# Patient Record
Sex: FEMALE | URBAN - METROPOLITAN AREA
[De-identification: names, ages, dates, MRNs, and addresses within clinical notes are randomized per-mention and may not be internally consistent; named-entity substitution may affect disease eponyms.]

---

## 2017-11-01 ENCOUNTER — OUTPATIENT (OUTPATIENT)
Dept: OUTPATIENT SERVICES | Facility: HOSPITAL | Age: 27
LOS: 1 days | End: 2017-11-01
Payer: MEDICAID

## 2017-11-01 PROCEDURE — G9001: CPT

## 2017-11-27 ENCOUNTER — EMERGENCY (EMERGENCY)
Facility: HOSPITAL | Age: 27
LOS: 1 days | Discharge: LEFT BEFORE DISCHARGE | End: 2017-11-27
Attending: EMERGENCY MEDICINE | Admitting: EMERGENCY MEDICINE
Payer: MEDICAID

## 2017-11-27 VITALS
DIASTOLIC BLOOD PRESSURE: 74 MMHG | TEMPERATURE: 98 F | OXYGEN SATURATION: 100 % | SYSTOLIC BLOOD PRESSURE: 119 MMHG | HEART RATE: 88 BPM | RESPIRATION RATE: 16 BRPM

## 2017-11-27 VITALS
RESPIRATION RATE: 18 BRPM | HEART RATE: 98 BPM | SYSTOLIC BLOOD PRESSURE: 133 MMHG | DIASTOLIC BLOOD PRESSURE: 75 MMHG | TEMPERATURE: 98 F | OXYGEN SATURATION: 100 %

## 2017-11-27 LAB
ALBUMIN SERPL ELPH-MCNC: 4.4 G/DL — SIGNIFICANT CHANGE UP (ref 3.3–5)
ALP SERPL-CCNC: 62 U/L — SIGNIFICANT CHANGE UP (ref 40–120)
ALT FLD-CCNC: 9 U/L — SIGNIFICANT CHANGE UP (ref 4–33)
APPEARANCE UR: CLEAR — SIGNIFICANT CHANGE UP
AST SERPL-CCNC: 18 U/L — SIGNIFICANT CHANGE UP (ref 4–32)
BASOPHILS # BLD AUTO: 0.06 K/UL — SIGNIFICANT CHANGE UP (ref 0–0.2)
BASOPHILS NFR BLD AUTO: 0.9 % — SIGNIFICANT CHANGE UP (ref 0–2)
BILIRUB SERPL-MCNC: 0.4 MG/DL — SIGNIFICANT CHANGE UP (ref 0.2–1.2)
BILIRUB UR-MCNC: NEGATIVE — SIGNIFICANT CHANGE UP
BLOOD UR QL VISUAL: HIGH
BUN SERPL-MCNC: 10 MG/DL — SIGNIFICANT CHANGE UP (ref 7–23)
CALCIUM SERPL-MCNC: 8.6 MG/DL — SIGNIFICANT CHANGE UP (ref 8.4–10.5)
CHLORIDE SERPL-SCNC: 103 MMOL/L — SIGNIFICANT CHANGE UP (ref 98–107)
CK MB BLD-MCNC: 1.16 NG/ML — SIGNIFICANT CHANGE UP (ref 1–4.7)
CK MB BLD-MCNC: SIGNIFICANT CHANGE UP (ref 0–2.5)
CK SERPL-CCNC: 58 U/L — SIGNIFICANT CHANGE UP (ref 25–170)
CO2 SERPL-SCNC: 23 MMOL/L — SIGNIFICANT CHANGE UP (ref 22–31)
COLOR SPEC: SIGNIFICANT CHANGE UP
CREAT SERPL-MCNC: 0.66 MG/DL — SIGNIFICANT CHANGE UP (ref 0.5–1.3)
D DIMER BLD IA.RAPID-MCNC: 176 NG/ML — SIGNIFICANT CHANGE UP
EOSINOPHIL # BLD AUTO: 0.06 K/UL — SIGNIFICANT CHANGE UP (ref 0–0.5)
EOSINOPHIL NFR BLD AUTO: 0.9 % — SIGNIFICANT CHANGE UP (ref 0–6)
GLUCOSE SERPL-MCNC: 99 MG/DL — SIGNIFICANT CHANGE UP (ref 70–99)
GLUCOSE UR-MCNC: NEGATIVE — SIGNIFICANT CHANGE UP
HCT VFR BLD CALC: 41.2 % — SIGNIFICANT CHANGE UP (ref 34.5–45)
HGB BLD-MCNC: 12.8 G/DL — SIGNIFICANT CHANGE UP (ref 11.5–15.5)
IMM GRANULOCYTES # BLD AUTO: 0.01 # — SIGNIFICANT CHANGE UP
IMM GRANULOCYTES NFR BLD AUTO: 0.1 % — SIGNIFICANT CHANGE UP (ref 0–1.5)
KETONES UR-MCNC: NEGATIVE — SIGNIFICANT CHANGE UP
LEUKOCYTE ESTERASE UR-ACNC: NEGATIVE — SIGNIFICANT CHANGE UP
LIDOCAIN IGE QN: 18.2 U/L — SIGNIFICANT CHANGE UP (ref 7–60)
LYMPHOCYTES # BLD AUTO: 2.24 K/UL — SIGNIFICANT CHANGE UP (ref 1–3.3)
LYMPHOCYTES # BLD AUTO: 32 % — SIGNIFICANT CHANGE UP (ref 13–44)
MAGNESIUM SERPL-MCNC: 1.9 MG/DL — SIGNIFICANT CHANGE UP (ref 1.6–2.6)
MCHC RBC-ENTMCNC: 25.5 PG — LOW (ref 27–34)
MCHC RBC-ENTMCNC: 31.1 % — LOW (ref 32–36)
MCV RBC AUTO: 82.1 FL — SIGNIFICANT CHANGE UP (ref 80–100)
MONOCYTES # BLD AUTO: 0.49 K/UL — SIGNIFICANT CHANGE UP (ref 0–0.9)
MONOCYTES NFR BLD AUTO: 7 % — SIGNIFICANT CHANGE UP (ref 2–14)
MUCOUS THREADS # UR AUTO: SIGNIFICANT CHANGE UP
NEUTROPHILS # BLD AUTO: 4.15 K/UL — SIGNIFICANT CHANGE UP (ref 1.8–7.4)
NEUTROPHILS NFR BLD AUTO: 59.1 % — SIGNIFICANT CHANGE UP (ref 43–77)
NITRITE UR-MCNC: NEGATIVE — SIGNIFICANT CHANGE UP
NRBC # FLD: 0 — SIGNIFICANT CHANGE UP
PH UR: 6 — SIGNIFICANT CHANGE UP (ref 4.6–8)
PHOSPHATE SERPL-MCNC: 3.2 MG/DL — SIGNIFICANT CHANGE UP (ref 2.5–4.5)
PLATELET # BLD AUTO: 318 K/UL — SIGNIFICANT CHANGE UP (ref 150–400)
PMV BLD: 10.1 FL — SIGNIFICANT CHANGE UP (ref 7–13)
POTASSIUM SERPL-MCNC: 3.8 MMOL/L — SIGNIFICANT CHANGE UP (ref 3.5–5.3)
POTASSIUM SERPL-SCNC: 3.8 MMOL/L — SIGNIFICANT CHANGE UP (ref 3.5–5.3)
PROT SERPL-MCNC: 8.3 G/DL — SIGNIFICANT CHANGE UP (ref 6–8.3)
PROT UR-MCNC: NEGATIVE — SIGNIFICANT CHANGE UP
RBC # BLD: 5.02 M/UL — SIGNIFICANT CHANGE UP (ref 3.8–5.2)
RBC # FLD: 15.4 % — HIGH (ref 10.3–14.5)
RBC CASTS # UR COMP ASSIST: SIGNIFICANT CHANGE UP (ref 0–?)
SODIUM SERPL-SCNC: 140 MMOL/L — SIGNIFICANT CHANGE UP (ref 135–145)
SP GR SPEC: 1.01 — SIGNIFICANT CHANGE UP (ref 1–1.03)
SQUAMOUS # UR AUTO: SIGNIFICANT CHANGE UP
TROPONIN T SERPL-MCNC: < 0.06 NG/ML — SIGNIFICANT CHANGE UP (ref 0–0.06)
UROBILINOGEN FLD QL: NORMAL E.U. — SIGNIFICANT CHANGE UP (ref 0.1–0.2)
WBC # BLD: 7.01 K/UL — SIGNIFICANT CHANGE UP (ref 3.8–10.5)
WBC # FLD AUTO: 7.01 K/UL — SIGNIFICANT CHANGE UP (ref 3.8–10.5)

## 2017-11-27 PROCEDURE — 99285 EMERGENCY DEPT VISIT HI MDM: CPT | Mod: 25

## 2017-11-27 PROCEDURE — 71020: CPT | Mod: 26

## 2017-11-27 NOTE — ED PROVIDER NOTE - OBJECTIVE STATEMENT
Pertinent PMH/PSH/FHx/SHx and Review of Systems contained within:  28yo F, denies pmh, 15 months  post-partum, presents c/o right upper back and chest pain with deep inspiration X 2 weeks, intermittent, mild intensity, non-exertional, associated with lightheadeness, NOT associated w/ sob or diaphoresis, not improved by anything in particular. On ROS, reports epigastric/mid abd pain, intermittent, mild intensity, associated w/ dark stools and diarrhea. Diarrhea lasted one week and resolved 1 wk ago, but dark stools persist. Pt denies hx of GERD or PUD and states she only took 1 nsaid (1 advil table 2 days ago). Denies family hx of early heart dx or sudden unexplained death. Denies OCP use, recent travel, long  drives, or hx of dvt/pe. LMP started ystdy and is "a little heavier than usual". No fever/chills, No photophobia/eye pain/changes in vision, No ear pain/sore throat/dysphagia,  no SOB/cough/wheeze/stridor, No N/V, no dysuria/frequency/discharge, No neck/back pain, no rash, no changes in neurological status/function.

## 2017-11-27 NOTE — ED PROVIDER NOTE - PROGRESS NOTE DETAILS
Reassessed pt, still no pain at this time. Informed her of labs/cxr results. Then reviewed pt's ECG: she has a long QT and ST depressions in leads III, aVF, V3 and V4, along with TWI in leads V3 and V4. Given her story of cp/back with associated dizziness and these ECG changes, ordered additional labs (Sofia , Mg, Phoshate) and asked pt to stay in CDU/obs unit for trending of enzymes and echo. She initially agreed, then informed the nurse 20mins later that she would like to leave. I spoke to pt again and she states that her 's friend is a cardiologist and she will try to see him sometime soon instead. I informed pt that she would be leaving against medical advice, explained all risk including death and pt still chooses to go. She has demonstrated clear decision making capacity.  -Dr. Palomo

## 2017-11-27 NOTE — ED PROVIDER NOTE - MEDICAL DECISION MAKING DETAILS
28yo F, denies pmh, here for intermittent pleuritic back and chest pain associated w/ dizziness, abd pain and dark stools. Diff dx includes GI bleed vs PE vs pericarditis vs msk pain. Unlikely to be infectious. Labs, upreg, ua, d-dimer, reassess.

## 2017-11-27 NOTE — ED ADULT TRIAGE NOTE - CHIEF COMPLAINT QUOTE
Ambulatory to triage, appears comfortable and in no apparent distress.  Presents to ED complaining of intermittent right sided chest pain that radiates to right shoulder x 2 wks.  Complaining of intermittent SOB and dizziness.  Denies nausea, vomiting or headache.  No past medical history.

## 2017-11-27 NOTE — ED PROVIDER NOTE - CARE PLAN
Principal Discharge DX:	Pleuritic chest pain Principal Discharge DX:	Pleuritic chest pain  Secondary Diagnosis:	Chest pain of unknown etiology

## 2017-11-27 NOTE — ED ADULT NURSE NOTE - OBJECTIVE STATEMENT
Pt A+OX3 c/o CP x2 weeks increasing with deep breaths.  Appears comfortable presently.  No SOB.  Labs obtained and sent as ordered.  #20g SL R AC placed.  CM placed.  EKG done

## 2017-11-27 NOTE — ED PROVIDER NOTE - PHYSICAL EXAMINATION
Gen: Alert, NAD  Head: NC, AT, EOMI, normal lids/conjunctiva  ENT:  normal hearing, patent oropharynx without erythema/exudate, uvula midline  Neck: +supple, no tenderness/meningismus/JVD, +Trachea midline  Chest: no chest wall tenderness, equal chest rise  Pulm: Bilateral BS, normal resp effort, no wheeze/stridor/retractions  CV: tachycardic, no M/R/G, +dist pulses  Abd: soft, NT/ND, +BS, no hepatosplenomegaly  Rectal: no hemorrhoids, brown stool, no gross blood, guaiac negative  Mskel: no edema/erythema/cyanosis  Skin: no rash  Neuro: AAOx3

## 2017-11-28 DIAGNOSIS — R69 ILLNESS, UNSPECIFIED: ICD-10-CM

## 2018-03-08 ENCOUNTER — EMERGENCY (EMERGENCY)
Facility: HOSPITAL | Age: 28
LOS: 1 days | Discharge: HOME | End: 2018-03-08
Admitting: EMERGENCY MEDICINE
Payer: MEDICAID

## 2018-03-08 VITALS
OXYGEN SATURATION: 100 % | RESPIRATION RATE: 18 BRPM | HEART RATE: 84 BPM | DIASTOLIC BLOOD PRESSURE: 75 MMHG | SYSTOLIC BLOOD PRESSURE: 126 MMHG

## 2018-03-08 VITALS
HEART RATE: 112 BPM | OXYGEN SATURATION: 100 % | TEMPERATURE: 98 F | RESPIRATION RATE: 20 BRPM | SYSTOLIC BLOOD PRESSURE: 145 MMHG | DIASTOLIC BLOOD PRESSURE: 86 MMHG

## 2018-03-08 PROCEDURE — 99283 EMERGENCY DEPT VISIT LOW MDM: CPT

## 2018-03-08 NOTE — ED ADULT TRIAGE NOTE - CHIEF COMPLAINT QUOTE
Pt co pain to right breast x one month today pain is now to left side of breast. Pt denies any discharge or discoloration from breast.  Pt denies sob.

## 2018-03-08 NOTE — ED PROVIDER NOTE - OBJECTIVE STATEMENT
26 y/o female with no pmhx presents to ED for intermittent b/l breast pain x 1 month. Pt denies any breast pain, CP or SOB at this time. States gets sharp pain in b/l breasts in both outer quadrants. Pain lasts 1-2 seconds. Did a self breast exam before coming in, states breasts feel the same to her, did not notice any masses. Does not have OBGYN, last followed up 2.5 years ago for her pregnancy. LMP 2/2/18. Sexually active with her . As per naren, pt was in ER 11/17 for CP and SOB, pt states those symptoms resolved in november and this pain is different. Followed up with Cardiologist and echo was normal. Admits to feeling concerned and anxious about her breasts so came to ER. No recent travel, surgeries, hospitalizations, immobilization, cardiac hx. No fever, chills, swelling, redness, nipple discharge, weakness, numbness, tingling, diaphoresis, n/v, abd pain, pelvic pain. No family hx of breast CA.

## 2018-03-08 NOTE — ED PROVIDER NOTE - CARE PLAN
Principal Discharge DX:	Breast pain  Assessment and plan of treatment:	Follow up with your primary care provider within 48 hours. Follow up with OBGYN this week, referral list given. Take Ibuprofen as needed over the counter for pain. You may also call OBGYN clinic at [phone]. Return to ER for any new or worsening symptoms, fever, chills, redness, streaking (red lines), swelling, nipple discharge, chest pain, shortness of breathe or any other concerns.

## 2018-03-08 NOTE — ED PROVIDER NOTE - PLAN OF CARE
Follow up with your primary care provider within 48 hours. Follow up with OBGYN this week, referral list given. Take Ibuprofen as needed over the counter for pain. You may also call OBGYN clinic at [phone]. Return to ER for any new or worsening symptoms, fever, chills, redness, streaking (red lines), swelling, nipple discharge, chest pain, shortness of breathe or any other concerns.

## 2018-03-08 NOTE — ED PROVIDER NOTE - PROGRESS NOTE DETAILS
PA Gerasimou - vitals stable. EKG unchanged from previous. UCG negative. pt appears more comfortable and not as anxious after discussion of plan. skin on neck no longer flushed. well appearing. denies cp, sob, or pain. amenable for dc with follow up. discussed return precautions.

## 2018-03-08 NOTE — ED PROVIDER NOTE - SKIN BREAST LEFT
B/L lumpy breasts. Right: +small cyst in inner upper quadrant and 2nd cyst in upper outer quadrant. Non-tender. Left: +cyst in outer upper quadrant. No swelling, redness, skin discoloration, or nipple discharge b/l.

## 2018-03-08 NOTE — ED PROVIDER NOTE - CONSTITUTIONAL, MLM
normal... Well appearing, well nourished, awake, alert, oriented to person, place, time/situation and in no apparent distress. Neck flushed, anxious.

## 2018-03-08 NOTE — ED PROVIDER NOTE - MEDICAL DECISION MAKING DETAILS
28 y/o female with no pmhx presents to ED for intermittent b/l breast pain x 1 month. Likely fibrocystic. Will obtain EKG, ucg. Re-vital. Recommend outpt OBGYN follow up for possible sono/mammo and further work up. Given return precuations.

## 2021-08-12 ENCOUNTER — EMERGENCY (EMERGENCY)
Facility: HOSPITAL | Age: 31
LOS: 1 days | Discharge: HOME | End: 2021-08-12
Attending: EMERGENCY MEDICINE | Admitting: EMERGENCY MEDICINE
Payer: COMMERCIAL

## 2021-08-12 VITALS
HEART RATE: 94 BPM | SYSTOLIC BLOOD PRESSURE: 106 MMHG | TEMPERATURE: 99 F | OXYGEN SATURATION: 100 % | RESPIRATION RATE: 16 BRPM | DIASTOLIC BLOOD PRESSURE: 61 MMHG

## 2021-08-12 VITALS
HEART RATE: 96 BPM | TEMPERATURE: 99 F | SYSTOLIC BLOOD PRESSURE: 136 MMHG | RESPIRATION RATE: 18 BRPM | DIASTOLIC BLOOD PRESSURE: 60 MMHG | OXYGEN SATURATION: 99 %

## 2021-08-12 LAB
ALBUMIN SERPL ELPH-MCNC: 4.5 G/DL — SIGNIFICANT CHANGE UP (ref 3.3–5)
ALP SERPL-CCNC: 64 U/L — SIGNIFICANT CHANGE UP (ref 40–120)
ALT FLD-CCNC: 18 U/L — SIGNIFICANT CHANGE UP (ref 4–33)
ANION GAP SERPL CALC-SCNC: 16 MMOL/L — HIGH (ref 7–14)
APPEARANCE UR: CLEAR — SIGNIFICANT CHANGE UP
AST SERPL-CCNC: 16 U/L — SIGNIFICANT CHANGE UP (ref 4–32)
BACTERIA # UR AUTO: NEGATIVE — SIGNIFICANT CHANGE UP
BASOPHILS # BLD AUTO: 0.06 K/UL — SIGNIFICANT CHANGE UP (ref 0–0.2)
BASOPHILS NFR BLD AUTO: 0.4 % — SIGNIFICANT CHANGE UP (ref 0–2)
BILIRUB SERPL-MCNC: <0.2 MG/DL — SIGNIFICANT CHANGE UP (ref 0.2–1.2)
BILIRUB UR-MCNC: NEGATIVE — SIGNIFICANT CHANGE UP
BUN SERPL-MCNC: 12 MG/DL — SIGNIFICANT CHANGE UP (ref 7–23)
CALCIUM SERPL-MCNC: 9.5 MG/DL — SIGNIFICANT CHANGE UP (ref 8.4–10.5)
CHLORIDE SERPL-SCNC: 102 MMOL/L — SIGNIFICANT CHANGE UP (ref 98–107)
CO2 SERPL-SCNC: 19 MMOL/L — LOW (ref 22–31)
COLOR SPEC: YELLOW — SIGNIFICANT CHANGE UP
CREAT SERPL-MCNC: 0.5 MG/DL — SIGNIFICANT CHANGE UP (ref 0.5–1.3)
DIFF PNL FLD: NEGATIVE — SIGNIFICANT CHANGE UP
EOSINOPHIL # BLD AUTO: 0.08 K/UL — SIGNIFICANT CHANGE UP (ref 0–0.5)
EOSINOPHIL NFR BLD AUTO: 0.6 % — SIGNIFICANT CHANGE UP (ref 0–6)
EPI CELLS # UR: 1 /HPF — SIGNIFICANT CHANGE UP (ref 0–5)
GLUCOSE SERPL-MCNC: 97 MG/DL — SIGNIFICANT CHANGE UP (ref 70–99)
GLUCOSE UR QL: NEGATIVE — SIGNIFICANT CHANGE UP
HCG SERPL-ACNC: SIGNIFICANT CHANGE UP MIU/ML
HCT VFR BLD CALC: 36.3 % — SIGNIFICANT CHANGE UP (ref 34.5–45)
HGB BLD-MCNC: 11.4 G/DL — LOW (ref 11.5–15.5)
HYALINE CASTS # UR AUTO: 1 /LPF — SIGNIFICANT CHANGE UP (ref 0–7)
IANC: 10.02 K/UL — HIGH (ref 1.5–8.5)
IMM GRANULOCYTES NFR BLD AUTO: 0.3 % — SIGNIFICANT CHANGE UP (ref 0–1.5)
KETONES UR-MCNC: ABNORMAL
LEUKOCYTE ESTERASE UR-ACNC: NEGATIVE — SIGNIFICANT CHANGE UP
LYMPHOCYTES # BLD AUTO: 23.3 % — SIGNIFICANT CHANGE UP (ref 13–44)
LYMPHOCYTES # BLD AUTO: 3.37 K/UL — HIGH (ref 1–3.3)
MCHC RBC-ENTMCNC: 25.3 PG — LOW (ref 27–34)
MCHC RBC-ENTMCNC: 31.4 GM/DL — LOW (ref 32–36)
MCV RBC AUTO: 80.5 FL — SIGNIFICANT CHANGE UP (ref 80–100)
MONOCYTES # BLD AUTO: 0.88 K/UL — SIGNIFICANT CHANGE UP (ref 0–0.9)
MONOCYTES NFR BLD AUTO: 6.1 % — SIGNIFICANT CHANGE UP (ref 2–14)
NEUTROPHILS # BLD AUTO: 10.02 K/UL — HIGH (ref 1.8–7.4)
NEUTROPHILS NFR BLD AUTO: 69.3 % — SIGNIFICANT CHANGE UP (ref 43–77)
NITRITE UR-MCNC: NEGATIVE — SIGNIFICANT CHANGE UP
NRBC # BLD: 0 /100 WBCS — SIGNIFICANT CHANGE UP
NRBC # FLD: 0 K/UL — SIGNIFICANT CHANGE UP
PH UR: 7 — SIGNIFICANT CHANGE UP (ref 5–8)
PLATELET # BLD AUTO: 323 K/UL — SIGNIFICANT CHANGE UP (ref 150–400)
POTASSIUM SERPL-MCNC: 3.4 MMOL/L — LOW (ref 3.5–5.3)
POTASSIUM SERPL-SCNC: 3.4 MMOL/L — LOW (ref 3.5–5.3)
PROT SERPL-MCNC: 7.8 G/DL — SIGNIFICANT CHANGE UP (ref 6–8.3)
PROT UR-MCNC: ABNORMAL
RBC # BLD: 4.51 M/UL — SIGNIFICANT CHANGE UP (ref 3.8–5.2)
RBC # FLD: 16.8 % — HIGH (ref 10.3–14.5)
RBC CASTS # UR COMP ASSIST: 3 /HPF — SIGNIFICANT CHANGE UP (ref 0–4)
SODIUM SERPL-SCNC: 137 MMOL/L — SIGNIFICANT CHANGE UP (ref 135–145)
SP GR SPEC: 1.03 — HIGH (ref 1.01–1.02)
UROBILINOGEN FLD QL: SIGNIFICANT CHANGE UP
WBC # BLD: 14.46 K/UL — HIGH (ref 3.8–10.5)
WBC # FLD AUTO: 14.46 K/UL — HIGH (ref 3.8–10.5)
WBC UR QL: 1 /HPF — SIGNIFICANT CHANGE UP (ref 0–5)

## 2021-08-12 PROCEDURE — 76801 OB US < 14 WKS SINGLE FETUS: CPT | Mod: 26

## 2021-08-12 PROCEDURE — 99284 EMERGENCY DEPT VISIT MOD MDM: CPT

## 2021-08-12 NOTE — ED PROVIDER NOTE - OBJECTIVE STATEMENT
Attending/Mariya: 31 yo F  LMP 21 p/w suprapubic cramp-like pain since this morning. Denies vag d/c, weakness or lightheadedness. Pt relayed concern over the pregnancy due to decrease in nausea and breast tenderness.  Pt is unable to recall her OB's name who is lcoated in [place]. She reports having had an US for this pregnancy. Attending/Mariya: 31 yo F  LMP 21 p/w suprapubic cramp-like pain since this morning. Denies vag d/c, weakness or lightheadedness. Pt relayed concern over the pregnancy due to decrease in nausea and breast tenderness.  Pt is unable to recall her OB's name who is lcoated in [place]. She reports having had an US for this pregnancy.    SHAMAR Rangel: 29 Y/O F C/O lower abdominal pain x 1 day which is cramping in quality and radiates to the back. Pt denies nausea, vomiting or urinary symptoms. Pt states the symptoms feel like her period cramping but denies vaginal bleeding. LMP 21, pt states she already had an U/S which revealed a live IUP.

## 2021-08-12 NOTE — ED ADULT NURSE NOTE - OBJECTIVE STATEMENT
Received Pt in intake room 12. pt A&OX3, ambulatory. Pt c/o abdominal pain and lower back pain beginning today. pt is 8 weeks pregnant. appears to be in no distress. respirations are equal and nonlabored, no respiratory distress noted. pt denies any cp, sob, cough, fever/chills, headache, dizziness, n/v. 20 gauge iv placed in the left arm labs sent.

## 2021-08-12 NOTE — ED PROVIDER NOTE - PATIENT PORTAL LINK FT
You can access the FollowMyHealth Patient Portal offered by [facility] by registering at the following website: [email or web address]. By joining [facility]’s FollowMyHealth portal, you will also be able to view your health information using other applications (apps) compatible with our system.

## 2021-08-12 NOTE — ED PROVIDER NOTE - NSFOLLOWUPINSTRUCTIONS_ED_ALL_ED_FT
Your blood pressure is elevated. Please follow up with your primary care physician to have it recheck. Your blood pressure is elevated. Please follow up with your primary care physician to have it recheck.   Follow up with your OBGYN as soon as possible, bring this packet which includes copies of your results. Advance activity as tolerated.  Continue all previously prescribed medications as directed.  Follow up with your primary care physician in 48-72 hours- bring copies of your results.  Return to the ER for worsening or persistent symptoms, and/or ANY NEW OR CONCERNING SYMPTOMS. This includes severe pain, nausea, vomiting, fever, chills or for any other symptoms that concern you. If you have issues obtaining follow up, please call: [phone] to obtain a doctor or specialist who takes your insurance in your area.  You may call [phone] to make an appointment with the internal medicine clinic.

## 2021-08-12 NOTE — ED PROVIDER NOTE - WET READ LAUNCH FT
"  Subjective:       Patient ID: Jose Luis Crowder is a 29 y.o. female.    Chief Complaint:  Gynecologic Exam (birth control follow up )      History of Present Illness  30yo  presents for f/u on patches.  Skipping cycles, no vb/spotting.  However, states patches make her feel "pregnant" desires to switch to Nexplanon.  No other complaints today.     GYN & OB History  Patient's last menstrual period was 2019.   Date of Last Pap: 2019    OB History    Para Term  AB Living   2 1 1   1 1   SAB TAB Ectopic Multiple Live Births   1     0 1      # Outcome Date GA Lbr Neftali/2nd Weight Sex Delivery Anes PTL Lv   2 SAB 17           1 Term 17 39w3d / 00:26 3.09 kg (6 lb 13 oz) F Vag-Vacuum EPI N GAIL       Review of Systems  Review of Systems: neg x 10, except as per HPI        Objective:    Physical Exam     BP 92/70   Ht 5' 5" (1.651 m)   Wt 62.3 kg (137 lb 4.8 oz)   LMP 2019   BMI 22.85 kg/m²     Gen: NAD  Resp: Normal respiratory effort  Abd: soft, NT  Pelvic: deferred  Ext: normal ROM  Psych: appropriate affect  Neuro: grossly intact    Assessment:        1. Evaluation regarding contraception options              Plan:      Discussed contraception options, desires to switch to Nexplanon.  Paperwork submitted today.  Will continue continuous patches until Nexplanon insertion.  Counseling done, precautions given, all questions answered.  RTC for Nexplanon insertion.        "
There are no Wet Read(s) to document.

## 2021-08-12 NOTE — ED ADULT NURSE NOTE - CCCP TRG CHIEF CMPLNT
PROCEDURE:  Radiographs of Nasal Bones



HISTORY:

trauma







COMPARISON:

None available.



TECHNIQUE:

Frontal and lateral radiographs of the nasal bones.



FINDINGS:

No fracture of nasal bones visualized. No destructive lesion. The 

nasal soft tissues are normal. 



IMPRESSION:

No acute nasal bone fracture visualized.
back pain/pregnant/abdominal pain

## 2021-08-12 NOTE — ED PROVIDER NOTE - PHYSICAL EXAMINATION
Attending/Mariya: Well-appearing, NAD; PERRL/EOMI, non-icterus, supple, no LEE, no JVD, RRR, CTAB; Abd-soft, NT/ND, no HSM; no LE edema, A&Ox3, nonfocal; Skin-warm/dry  /76

## 2021-08-14 LAB
CULTURE RESULTS: SIGNIFICANT CHANGE UP
SPECIMEN SOURCE: SIGNIFICANT CHANGE UP